# Patient Record
Sex: FEMALE | Race: WHITE | NOT HISPANIC OR LATINO | URBAN - METROPOLITAN AREA
[De-identification: names, ages, dates, MRNs, and addresses within clinical notes are randomized per-mention and may not be internally consistent; named-entity substitution may affect disease eponyms.]

---

## 2017-11-22 NOTE — PATIENT DISCUSSION
The patient feels that the cataract is significantly impacting daily activities and has elected cataract surgery. The risks, benefits, and alternatives to surgery were discussed. The patient elects to proceed with surgery. BASIC GOAL ENRIQUE i only want what my insurance will cover.

## 2017-11-22 NOTE — PATIENT DISCUSSION
The patient feels that the cataract is significantly impacting daily activities and has elected cataract surgery. The risks, benefits, and alternatives to surgery were discussed. The patient elects to proceed with surgery. R/R after first is stable.

## 2017-12-07 NOTE — PATIENT DISCUSSION
The patient was seen post-operatively following surgery. The patient was advised that the eye tolerated the surgery well without complications. I recommended that the patient start the post-operative antibiotic and steroid eye drops. The patient was also advised to contact the office immediately if new symptoms or visual changes are noted. The patient was given additional written postoperative instructions.

## 2018-02-08 NOTE — PATIENT DISCUSSION
Based on today’s exam and diagnostic studies, the determination was made for treatment today. all other ROS negative except as per HPI

## 2018-02-08 NOTE — PROCEDURE NOTE: CLINICAL
PROCEDURE NOTE: Avastin () #1 OD. Anesthesia: Topical/Subconjunctival. Prep: Betadine Drops and Betadine Scrub. Intravitreal injection of Avastin 1.25mg/0.05 ml was given. Injection site: 3-4 mm from the limbus. Patient tolerated procedure well. CF vision checked. There were no complications. Post-op instructions given. Lot #: [email or web address]. Expiration Date: . EXP. Inventory Id: nalini Boss

## 2018-03-13 NOTE — PROCEDURE NOTE: CLINICAL
PROCEDURE NOTE: Avastin () #2 OD. Diagnosis: Neovascular AMD with Active CNV. Anesthesia: Topical/Subconjunctival. Prep: Betadine Drops and Betadine Scrub. Intravitreal injection of Avastin 1.25mg/0.05 ml was given. Injection site: 3-4 mm from the limbus. Patient tolerated procedure well. CF vision checked. There were no complications. Post-op instructions given. Lot #: [email or web address]. Expiration Date: Fri May 04 2018 00:00:00 [facility] Daylight Time). Inventory Id: nalini Gandhi Annemarie

## 2018-07-26 NOTE — PATIENT DISCUSSION
Based on today’s exam and diagnostic studies, the determination was made for treatment today and another injection in one month.

## 2018-07-26 NOTE — PROCEDURE NOTE: CLINICAL
PROCEDURE NOTE: Avastin () #5 OD. Diagnosis: Neovascular AMD with Active CNV. Anesthesia: Topical. Prep: Betadine Drops and Betadine Scrub. Intravitreal injection of Avastin 1.25mg/0.05 ml was given. Injection site: 3-4 mm from the limbus. Patient tolerated procedure well. CF vision checked. There were no complications. Post-op instructions given. Lot #: [id number]. Expiration Date: . EXP. Inventory Id: nalini Du

## 2018-09-27 NOTE — PROCEDURE NOTE: CLINICAL
PROCEDURE NOTE: Avastin () #7 OD. Diagnosis: Neovascular AMD with Active CNV. Anesthesia: Topical/Subconjunctival. Prep: Betadine Drops and Betadine Scrub. Intravitreal injection of Avastin 1.25mg/0.05 ml was given. Injection site: 3-4 mm from the limbus. Patient tolerated procedure well. CF vision checked. There were no complications. Post-op instructions given. Lot #: [email or web address]. Expiration Date: Wed Oct 24 2018 00:00:00 [facility] Daylight Time). Inventory Id: nalini Martínez

## 2018-09-27 NOTE — PATIENT DISCUSSION
Discussed with the patient that the fluid is resolving but there is a tiny amount that needs to be treated today.

## 2019-01-30 ENCOUNTER — IMPORTED ENCOUNTER (OUTPATIENT)
Dept: URBAN - METROPOLITAN AREA CLINIC 31 | Facility: CLINIC | Age: 64
End: 2019-01-30

## 2019-01-30 PROBLEM — H25.813: Noted: 2019-01-30

## 2019-01-30 PROBLEM — H53.022: Noted: 2019-01-30

## 2019-01-30 PROCEDURE — 92014 COMPRE OPH EXAM EST PT 1/>: CPT

## 2019-01-30 NOTE — PATIENT DISCUSSION
1.  Refractive error - update rx and modify OS to reduce aneisometropia2. Combined Types of Cataract OU: Explained how cataracts can effect vision. Recommend clinical observation. The patient was advised to contact us if any change or worsening of vision. 3. Amblyopia: Monitor vision limited in affected eye. 4. Return for an appointment in 1 year for comprehensive exam. with Dr. Irene iM

## 2019-02-07 NOTE — PROCEDURE NOTE: CLINICAL
PROCEDURE NOTE: Avastin () #9 OD. Diagnosis: Neovascular AMD with Active CNV. Anesthesia: Topical/Subconjunctival. Prep: Betadine Drops and Betadine Scrub. Intravitreal injection of Avastin 1.25mg/0.05 ml was given. Injection site: 3-4 mm from the limbus. Patient tolerated procedure well. CF vision checked. There were no complications. Post-op instructions given. Lot #: [id number]. Expiration Date: Sun Mar 18 2018 00:00:00 [address] Daylight Time). Inventory Id: nalini Zimmerman

## 2019-02-07 NOTE — PATIENT DISCUSSION
I have recommended an injection while Mr. Nery Fowler is here today, due to a recurrence of edema seen on exam and OCT. I will see him on a monthly basis for 2 more.

## 2019-06-25 NOTE — PATIENT DISCUSSION
Discussed with patient that there is less fluid present today. I recommended that we do an Avastin injection today followed by 2 more on a monthly basis.

## 2019-06-25 NOTE — PROCEDURE NOTE: CLINICAL
PROCEDURE NOTE: Avastin () #13 OD. Diagnosis: Neovascular AMD with Active CNV. Anesthesia: Topical/Subconjunctival. Prep: Betadine Drops and Betadine Scrub. Intravitreal injection of Avastin 1.25mg/0.05 ml was given. Injection site: 3-4 mm from the limbus. Patient tolerated procedure well. CF vision checked. There were no complications. Post-op instructions given. Lot #: [id number]. Expiration Date: . EXP. Inventory Id: nalini Howell

## 2020-02-06 ENCOUNTER — IMPORTED ENCOUNTER (OUTPATIENT)
Dept: URBAN - METROPOLITAN AREA CLINIC 31 | Facility: CLINIC | Age: 65
End: 2020-02-06

## 2020-02-06 PROBLEM — H04.123: Noted: 2020-02-06

## 2020-02-06 PROBLEM — H25.813: Noted: 2020-02-06

## 2020-02-06 PROBLEM — H53.022: Noted: 2020-02-06

## 2020-02-06 PROCEDURE — 92014 COMPRE OPH EXAM EST PT 1/>: CPT

## 2020-02-06 PROCEDURE — 92015 DETERMINE REFRACTIVE STATE: CPT

## 2020-02-06 NOTE — PATIENT DISCUSSION
1.  Dry Eye OU:  Continue current management with Artificial Tears. 2.  Combined Types of Cataract OU: Explained how cataracts can effect vision. Recommend clinical observation. The patient was advised to contact us if any change or worsening of vision. 3. Amblyopia: Monitor vision limited in affected eye. 4. Refractive error - Glasses change optional. Polycarbonate/Trivex. 5.  Return for an appointment in 1 year for comprehensive exam. with Dr. Pelon Dee.

## 2020-02-06 NOTE — PATIENT DISCUSSION
Return for an appointment in 1 year for comprehensive exam. with Dr. Charlton Sees. standing still standing still. standing still./none

## 2020-02-11 NOTE — PATIENT DISCUSSION
I have recommended that he have an injection of Avastin in the right eye while he is here today. I will see him on a monthly basis.

## 2020-02-11 NOTE — PATIENT DISCUSSION
Discussed with patient that there is less fluid present today. I have recommended that he have an injection of Avastin today followed by 2 more on a monthly basis.

## 2020-05-14 NOTE — PROCEDURE NOTE: CLINICAL
PROCEDURE NOTE: Avastin () #22 OD. Diagnosis: Neovascular AMD with Active CNV. Anesthesia: Topical/Subconjunctival. Prep: Betadine Drops and Betadine Scrub. Intravitreal injection of Avastin 1.25mg/0.05 ml was given. Injection site: 3-4 mm from the limbus. Patient tolerated procedure well. CF vision checked. There were no complications. Post-op instructions given. Lot #: [id number]. Expiration Date: 7/26/2020. Inventory Id: nalini Parikh

## 2020-07-23 NOTE — PATIENT DISCUSSION
No treatment needed today. I will see him in 1 month for re-evaluation with the possibility of an injection.

## 2020-08-20 NOTE — PROCEDURE NOTE: CLINICAL
PROCEDURE NOTE: Avastin () #24 OD. Diagnosis: Neovascular AMD with Active CNV. Anesthesia: Topical/Subconjunctival. Prep: Betadine Drops and Betadine Scrub. Intravitreal injection of Avastin 1.25mg/0.05 ml was given. Injection site: 3-4 mm from the limbus. Patient tolerated procedure well. CF vision checked. There were no complications. Post-op instructions given. Lot #: [id number]. Expiration Date: 10/19/2020. Inventory Id: nalini Fox

## 2020-08-20 NOTE — PATIENT DISCUSSION
Patient understands condition, prognosis and need for follow up care. I will plan another Avastin injection in one month.

## 2020-10-20 NOTE — PATIENT DISCUSSION
Avastin recommended today after discussion of benefits, risks and alternatives. The injection was given and tolerated well by patient. Post-injection instructions were reviewed and understood by the patient. ---

## 2020-11-13 ENCOUNTER — OFFICE VISIT (OUTPATIENT)
Dept: URBAN - METROPOLITAN AREA CLINIC 63 | Facility: CLINIC | Age: 65
End: 2020-11-13

## 2020-12-10 NOTE — PROCEDURE NOTE: CLINICAL
PROCEDURE NOTE: Avastin () #26 OD. Diagnosis: Neovascular AMD with Active CNV. Anesthesia: Topical/Subconjunctival. Prep: Betadine Drops and Betadine Scrub. Intravitreal injection of Avastin 1.25mg/0.05 ml was given. Injection site: 3-4 mm from the limbus. Patient tolerated procedure well. CF vision checked. There were no complications. Post-op instructions given. Lot #: [id number]. Expiration Date: 1/14/2021. Inventory Id: kwame. Brody Cevallos

## 2021-01-07 ENCOUNTER — OFFICE VISIT (OUTPATIENT)
Dept: URBAN - METROPOLITAN AREA CLINIC 63 | Facility: CLINIC | Age: 66
End: 2021-01-07

## 2021-01-22 ENCOUNTER — OFFICE VISIT (OUTPATIENT)
Dept: URBAN - METROPOLITAN AREA CLINIC 63 | Facility: CLINIC | Age: 66
End: 2021-01-22

## 2021-02-08 ENCOUNTER — IMPORTED ENCOUNTER (OUTPATIENT)
Dept: URBAN - METROPOLITAN AREA CLINIC 31 | Facility: CLINIC | Age: 66
End: 2021-02-08

## 2021-02-08 PROBLEM — H53.022: Noted: 2021-02-08

## 2021-02-08 PROBLEM — H25.813: Noted: 2021-02-08

## 2021-02-08 PROBLEM — H04.123: Noted: 2021-02-08

## 2021-02-08 PROCEDURE — 92014 COMPRE OPH EXAM EST PT 1/>: CPT

## 2021-02-08 NOTE — PATIENT DISCUSSION
1.  Dry Eye OU:  Continue current management with Artificial Tears. 2.  Combined Types of Cataract OU: Monitor. 3. Amblyopia: Monitor vision limited in affected eye. 4. Refractive error - Consider glasses change. Polycarbonate/Trivex. 5.  Return for an appointment in 1 year for comprehensive exam. with Dr. Kenny Urbina.

## 2021-02-17 ENCOUNTER — OFFICE VISIT (OUTPATIENT)
Dept: URBAN - METROPOLITAN AREA SURGERY CENTER 4 | Facility: SURGERY CENTER | Age: 66
End: 2021-02-17

## 2021-02-19 LAB — PATHOLOGY (INDENTED REPORT): (no result)

## 2021-03-04 ENCOUNTER — OFFICE VISIT (OUTPATIENT)
Dept: URBAN - METROPOLITAN AREA CLINIC 63 | Facility: CLINIC | Age: 66
End: 2021-03-04

## 2021-04-01 NOTE — PATIENT DISCUSSION
There is no recurrence of intraretinal or subretinal fluid on spectral domain OCT today. Defer injection today.

## 2021-05-13 NOTE — PATIENT DISCUSSION
35 minutes was spent in face to face dialogue with patient. I will plan an Avastin injection today followed by 2 more on a monthly basis.

## 2021-05-13 NOTE — PROCEDURE NOTE: CLINICAL
PROCEDURE NOTE: Avastin () #29 OD. Diagnosis: Neovascular AMD with Active CNV. Anesthesia: Topical/Subconjunctival. Prep: Betadine Drops and Betadine Scrub. Intravitreal injection of Avastin 1.25mg/0.05 ml was given. Injection site: 3-4 mm from the limbus. Patient tolerated procedure well. CF vision checked. There were no complications. Post-op instructions given. Lot #: [id number]. Expiration Date: 6/16/2021. Inventory Id: null. Brody Hand

## 2021-06-17 NOTE — PROCEDURE NOTE: CLINICAL
PROCEDURE NOTE: Avastin () #30 OD. Diagnosis: Neovascular AMD with Active CNV. Anesthesia: Topical/Subconjunctival. Prep: Betadine Drops and Betadine Scrub. Intravitreal injection of Avastin 1.25mg/0.05 ml was given. Injection site: 3-4 mm from the limbus. Patient tolerated procedure well. CF vision checked. There were no complications. Post-op instructions given. Lot #: [id number]. Expiration Date: 5857-15-82F77:00:00. Inventory Id: nalini Ramos

## 2021-07-01 NOTE — PATIENT DISCUSSION
CM spoke to [address] [place] [address]. Per Franklyn Alonso, pt daughter refusing HHC, stating that they would call when they are ready for services.   Ailyn Mallory RN matilde with patient.

## 2021-07-15 NOTE — PROCEDURE NOTE: CLINICAL
PROCEDURE NOTE: Avastin () #31 OD. Diagnosis: Neovascular AMD with Active CNV. Anesthesia: Topical. Prep: Betadine Drops and Betadine Scrub. Intravitreal injection of Avastin 1.25mg/0.05 ml was given. Injection site: 3-4 mm from the limbus. Patient tolerated procedure well. CF vision checked. There were no complications. Post-op instructions given. Lot #: [id number]. Expiration Date: 7331-80-40I27:00:00. Inventory Id: nalini Parikh

## 2021-08-26 NOTE — PATIENT DISCUSSION
35 minutes was spent in face to face dialogue with patient and his wife. I have deferred any treatment today and will see him in 6 weeks for re-evaluation with the possibility of an injection.

## 2021-10-08 NOTE — PROCEDURE NOTE: CLINICAL
PROCEDURE NOTE: Avastin () #32 OD. Diagnosis: Neovascular AMD with Active CNV. Anesthesia: Topical/Subconjunctival. Prep: Betadine Drops and Betadine Scrub. Intravitreal injection of Avastin 1.25mg/0.05 ml was given. Injection site: 3-4 mm from the limbus. Patient tolerated procedure well. CF vision checked. There were no complications. Post-op instructions given. Lot #: [id number]. Expiration Date: 12/18/2021. Inventory Id: nalini Rios

## 2021-10-08 NOTE — PATIENT DISCUSSION
25 minutes spent in face to face dialogue with patient and his wife. I have recommended that he have an injection of Avastin while he is here today. I will see him in 1 month for #33 OD.

## 2021-10-18 ENCOUNTER — IMPORTED ENCOUNTER (OUTPATIENT)
Dept: URBAN - METROPOLITAN AREA CLINIC 31 | Facility: CLINIC | Age: 66
End: 2021-10-18

## 2021-10-18 PROBLEM — H53.022: Noted: 2021-10-18

## 2021-10-18 PROBLEM — H04.123: Noted: 2021-10-18

## 2021-10-18 PROBLEM — H25.813: Noted: 2021-10-18

## 2021-10-18 PROCEDURE — 92015 DETERMINE REFRACTIVE STATE: CPT

## 2021-10-18 PROCEDURE — 92014 COMPRE OPH EXAM EST PT 1/>: CPT

## 2021-10-18 NOTE — PATIENT DISCUSSION
1.  Dry Eye OU:  Continue current management with Artificial Tears. 2.  Combined Types of Cataract OU: Monitor. 3. Amblyopia: Monitor vision limited in affected eye. 4. Refractive error - Glasses change. Polycarbonate/Trivex. 5. Mucous strands OU - Try Alaway or Zaditor QAM to see if helps reduce and stop fishing strands out with fingers. 5.  Return for an appointment in 1 year for comprehensive exam with Dr. Yo Whitley.

## 2021-11-16 NOTE — PROCEDURE NOTE: CLINICAL
PROCEDURE NOTE: Avastin () #33 OD. Diagnosis: Neovascular AMD with Active CNV. Anesthesia: Topical/Subconjunctival. Prep: Betadine Drops and Betadine Scrub. Intravitreal injection of Avastin 1.25mg/0.05 ml was given. Injection site: 3-4 mm from the limbus. Patient tolerated procedure well. CF vision checked. There were no complications. Post-op instructions given. Lot #: [id number]. Expiration Date: [id number]:00:00. Inventory Id: nalini Moncada

## 2021-12-31 NOTE — PATIENT DISCUSSION
Patient understands condition, prognosis and need for follow up care. Over 45 minutes spent in conversation with the patient. I will see him again in 2 months.

## 2022-01-03 ENCOUNTER — IMPORTED ENCOUNTER (OUTPATIENT)
Dept: URBAN - METROPOLITAN AREA CLINIC 31 | Facility: CLINIC | Age: 67
End: 2022-01-03

## 2022-01-03 NOTE — PATIENT DISCUSSION
Pt has adapted well to sunglasses. Has a problem with her clear pair especially when looking to the sides. Have clear pair remade at [facility] to new rx with power reduced OS to decrease aneiso (knows [facility] will not be as clear) and new power OD to reduce cyl. Pt has been more comfortable with OTC 2.50 even though OS is blurrier. Seems to be adapting well to new reading glasses too.

## 2022-02-21 NOTE — PATIENT DISCUSSION
Patient understands condition, prognosis and need for follow up care. Over 45 minutes spent in conversation with the patient. I will plan one more Avastin injection in the right eye in one month.

## 2022-02-21 NOTE — PATIENT DISCUSSION
Mild recurrent edema, Recommended Avastin injection today. The injection was given and tolerated well by patient. Post-injection instructions were reviewed and understood by the patient.

## 2022-02-21 NOTE — PROCEDURE NOTE: CLINICAL
PROCEDURE NOTE: Avastin () #34 OD. Diagnosis: Neovascular AMD with Active CNV. Anesthesia: Topical/Subconjunctival. Prep: Betadine Drops and Betadine Scrub. Betadine prep was performed. Product is within expiration date, and has been verified. An unopened 30 g needle was securely affixed to the 1 cc syringe. Excess drug and air bubbles were carefully expressed such that the plunger aligned with the .05 mL padmini on the syringe. A lid speculum was used. After the Betadine prep, intravitreal injection of Avastin 1.25mg/0.05 ml was given. Injection site: 3-4 mm from the limbus. The needle was withdrawn; retinal perfusion was verified. Lid speculum removed. The eye was irrigated with sterile irrigating solution. The betadine was washed away. Patient tolerated procedure well. Count fingers vision was verified. There were no complications. Patient given office phone number/answering service phone number. Post-injection instructions were reviewed and understood. Symptoms of RD and endophthalmitis following intravitreal injection were discussed. Patient advised to call right away if any loss of vision, new floaters, or eye pain. Post-op instructions given. Patient was given the standard instruction sheet. Appropriate follow-up was arranged. Art Moncada

## 2022-03-21 NOTE — PROCEDURE NOTE: CLINICAL
PROCEDURE NOTE: Avastin () #35 OD. Diagnosis: Neovascular AMD with Active CNV. Anesthesia: Topical/Subconjunctival. Prep: Betadine Drops and Betadine Scrub. Betadine prep was performed. Product is within expiration date, and has been verified. An unopened 30 g needle was securely affixed to the 1 cc syringe. Excess drug and air bubbles were carefully expressed such that the plunger aligned with the .05 mL padmini on the syringe. A lid speculum was used. After the Betadine prep, intravitreal injection of Avastin 1.25mg/0.05 ml was given. Injection site: 3-4 mm from the limbus. The needle was withdrawn; retinal perfusion was verified. Lid speculum removed. The eye was irrigated with sterile irrigating solution. The betadine was washed away. Patient tolerated procedure well. Count fingers vision was verified. There were no complications. Patient given office phone number/answering service phone number. Post-injection instructions were reviewed and understood. Symptoms of RD and endophthalmitis following intravitreal injection were discussed. Patient advised to call right away if any loss of vision, new floaters, or eye pain. Post-op instructions given. Patient was given the standard instruction sheet. Appropriate follow-up was arranged. Vandana Graves

## 2022-03-21 NOTE — PATIENT DISCUSSION
Avastin done today in the right eye without complication.  Mr. Vee Metzger will return in 4-6 weeks for exam and possible AVastin.

## 2022-04-01 ASSESSMENT — VISUAL ACUITY
OS_CC: J1
OD_SC: 20/20
OD_SC: 20/20-2
OS_PH: CC 20/50
OD_SC: 20/30-2
OS_SC: 20/60
OS_CC: J5
OS_CC: 20/400
OS_SC: 20/50-1
OS_PH: CC 20/40 -2
OS_SC: 20/60-1
OD_CC: J1+
OD_SC: 20/30-2
OD_CC: 20/80
OS_SC: 20/60-2
OS_PH: CC 20/30
OD_CC: J1+

## 2022-04-01 ASSESSMENT — TONOMETRY
OD_IOP_MMHG: 14
OS_IOP_MMHG: 16
OD_IOP_MMHG: 14
OS_IOP_MMHG: 15
OD_IOP_MMHG: 14
OS_IOP_MMHG: 16
OD_IOP_MMHG: 14
OS_IOP_MMHG: 14

## 2022-06-09 NOTE — PROCEDURE NOTE: CLINICAL
PROCEDURE NOTE: Avastin () #36 OD. Diagnosis: Neovascular AMD with Active CNV. Anesthesia: Topical/Subconjunctival. Prep: Betadine Drops and Betadine Scrub. Betadine prep was performed. Product is within expiration date, and has been verified. An unopened 30 g needle was securely affixed to the 1 cc syringe. Excess drug and air bubbles were carefully expressed such that the plunger aligned with the .05 mL padmini on the syringe. A lid speculum was used. After the Betadine prep, intravitreal injection of Avastin 1.25mg/0.05 ml was given. Injection site: 3-4 mm from the limbus. The needle was withdrawn; retinal perfusion was verified. Lid speculum removed. The eye was irrigated with sterile irrigating solution. The betadine was washed away. Patient tolerated procedure well. Count fingers vision was verified. There were no complications. Patient given office phone number/answering service phone number. Post-injection instructions were reviewed and understood. Symptoms of RD and endophthalmitis following intravitreal injection were discussed. Patient advised to call right away if any loss of vision, new floaters, or eye pain. Post-op instructions given. Patient was given the standard instruction sheet. Appropriate follow-up was arranged. Miriam Ernandez

## 2022-06-09 NOTE — PATIENT DISCUSSION
Avastin injection x36 RIGHT EYE today. The injection was given and tolerated well by patient. Post-injection instructions were reviewed and understood by the patient.

## 2022-06-09 NOTE — PATIENT DISCUSSION
Spent 35 min with patient.  Discussed findings on exam and OCT of the right eye.  I mentioned there is recurrent srf seen today. I recommend an Avastin injection today and follow up with me in 6 weeks for a poss avastin.  Next visit will be a complete visit.  Dilate both eyes and get oct both eyes.

## 2022-06-16 ENCOUNTER — OFFICE VISIT (OUTPATIENT)
Dept: URBAN - METROPOLITAN AREA CLINIC 63 | Facility: CLINIC | Age: 67
End: 2022-06-16

## 2022-07-09 ENCOUNTER — TELEPHONE ENCOUNTER (OUTPATIENT)
Dept: URBAN - METROPOLITAN AREA CLINIC 121 | Facility: CLINIC | Age: 67
End: 2022-07-09

## 2022-07-09 RX ORDER — BLOOD PRESSURE TEST KIT-LARGE
KIT MISCELLANEOUS
Refills: 0 | OUTPATIENT
Start: 2017-08-17 | End: 2017-10-16

## 2022-07-09 RX ORDER — TAMOXIFEN CITRATE 20 MG/1
TABLET, FILM COATED ORAL ONCE A DAY
Refills: 0 | OUTPATIENT
Start: 2017-08-17 | End: 2017-10-16

## 2022-07-10 ENCOUNTER — TELEPHONE ENCOUNTER (OUTPATIENT)
Dept: URBAN - METROPOLITAN AREA CLINIC 121 | Facility: CLINIC | Age: 67
End: 2022-07-10

## 2022-07-10 RX ORDER — BLOOD PRESSURE TEST KIT-LARGE
KIT MISCELLANEOUS ONCE A DAY
Refills: 0 | Status: ACTIVE | COMMUNITY
Start: 2017-10-16

## 2022-07-10 RX ORDER — ONDANSETRON HYDROCHLORIDE 4 MG/1
TAKE 1 TABLET 1 HOUR BEFORE EACH HALF OF COLON PREP TABLET, FILM COATED ORAL
Refills: 0 | Status: ACTIVE | COMMUNITY
Start: 2022-06-16

## 2022-07-10 RX ORDER — IRBESARTAN 150 MG/1
TABLET ORAL
Refills: 0 | Status: ACTIVE | COMMUNITY
Start: 2021-01-22

## 2022-07-10 RX ORDER — LEVOTHYROXINE SODIUM 50 UG/1
TABLET ORAL
Refills: 0 | Status: ACTIVE | COMMUNITY
Start: 2021-01-13

## 2022-07-10 RX ORDER — TAMOXIFEN CITRATE 20 MG/1
TABLET, FILM COATED ORAL ONCE A DAY
Refills: 0 | Status: ACTIVE | COMMUNITY
Start: 2017-10-16

## 2022-07-10 RX ORDER — OMEPRAZOLE 20 MG/1
CAPSULE, DELAYED RELEASE ORAL
Refills: 0 | Status: ACTIVE | COMMUNITY
Start: 2021-01-20

## 2022-07-20 ENCOUNTER — OFFICE VISIT (OUTPATIENT)
Dept: URBAN - METROPOLITAN AREA SURGERY CENTER 4 | Facility: SURGERY CENTER | Age: 67
End: 2022-07-20

## 2022-08-03 ENCOUNTER — OFFICE VISIT (OUTPATIENT)
Dept: URBAN - METROPOLITAN AREA SURGERY CENTER 4 | Facility: SURGERY CENTER | Age: 67
End: 2022-08-03
Payer: COMMERCIAL

## 2022-08-03 ENCOUNTER — TELEPHONE ENCOUNTER (OUTPATIENT)
Dept: URBAN - METROPOLITAN AREA CLINIC 63 | Facility: CLINIC | Age: 67
End: 2022-08-03

## 2022-08-03 ENCOUNTER — CLAIMS CREATED FROM THE CLAIM WINDOW (OUTPATIENT)
Dept: URBAN - METROPOLITAN AREA CLINIC 4 | Facility: CLINIC | Age: 67
End: 2022-08-03
Payer: COMMERCIAL

## 2022-08-03 DIAGNOSIS — Z86.010 COLON POLYP HISTORY: ICD-10-CM

## 2022-08-03 DIAGNOSIS — D12.3 BENIGN NEOPLASM OF TRANSVERSE COLON: ICD-10-CM

## 2022-08-03 DIAGNOSIS — D12.1 BENIGN NEOPLASM OF APPENDIX: ICD-10-CM

## 2022-08-03 DIAGNOSIS — K63.89 OTHER SPECIFIED DISEASES OF INTESTINE: ICD-10-CM

## 2022-08-03 DIAGNOSIS — K64.0 GRADE I INTERNAL HEMORRHOIDS: ICD-10-CM

## 2022-08-03 PROCEDURE — G8918 PT W/O PREOP ORDER IV AB PRO: HCPCS | Performed by: INTERNAL MEDICINE

## 2022-08-03 PROCEDURE — 45385 COLONOSCOPY W/LESION REMOVAL: CPT | Performed by: INTERNAL MEDICINE

## 2022-08-03 PROCEDURE — G8907 PT DOC NO EVENTS ON DISCHARG: HCPCS | Performed by: INTERNAL MEDICINE

## 2022-08-03 PROCEDURE — 88305 TISSUE EXAM BY PATHOLOGIST: CPT | Performed by: PATHOLOGY

## 2022-08-03 RX ORDER — TAMOXIFEN CITRATE 20 MG/1
TABLET, FILM COATED ORAL ONCE A DAY
Refills: 0 | Status: ACTIVE | COMMUNITY
Start: 2017-10-16

## 2022-08-03 RX ORDER — ONDANSETRON HYDROCHLORIDE 4 MG/1
TAKE 1 TABLET 1 HOUR BEFORE EACH HALF OF COLON PREP TABLET, FILM COATED ORAL
Refills: 0 | Status: ACTIVE | COMMUNITY
Start: 2022-06-16

## 2022-08-03 RX ORDER — IRBESARTAN 150 MG/1
TABLET ORAL
Refills: 0 | Status: ACTIVE | COMMUNITY
Start: 2021-01-22

## 2022-08-03 RX ORDER — BLOOD PRESSURE TEST KIT-LARGE
KIT MISCELLANEOUS ONCE A DAY
Refills: 0 | Status: ACTIVE | COMMUNITY
Start: 2017-10-16

## 2022-08-03 RX ORDER — LEVOTHYROXINE SODIUM 50 UG/1
TABLET ORAL
Refills: 0 | Status: ACTIVE | COMMUNITY
Start: 2021-01-13

## 2022-08-03 RX ORDER — OMEPRAZOLE 20 MG/1
CAPSULE, DELAYED RELEASE ORAL
Refills: 0 | Status: ACTIVE | COMMUNITY
Start: 2021-01-20

## 2022-08-04 ENCOUNTER — OFFICE VISIT (OUTPATIENT)
Dept: URBAN - METROPOLITAN AREA CLINIC 63 | Facility: CLINIC | Age: 67
End: 2022-08-04

## 2022-08-09 PROBLEM — 428283002 HISTORY OF POLYP OF COLON: Status: ACTIVE | Noted: 2022-08-09

## 2022-08-10 ENCOUNTER — TELEPHONE ENCOUNTER (OUTPATIENT)
Dept: URBAN - METROPOLITAN AREA CLINIC 23 | Facility: CLINIC | Age: 67
End: 2022-08-10

## 2022-08-11 ENCOUNTER — TELEPHONE ENCOUNTER (OUTPATIENT)
Dept: URBAN - METROPOLITAN AREA SURGERY CENTER 4 | Facility: SURGERY CENTER | Age: 67
End: 2022-08-11

## 2022-09-01 NOTE — PROCEDURE NOTE: CLINICAL
PROCEDURE NOTE: Avastin () #37 OD. Diagnosis: Neovascular AMD with Active CNV. Anesthesia: Topical/Subconjunctival. Prep: Betadine Drops and Betadine Scrub. Betadine prep was performed. Product is within expiration date, and has been verified. An unopened 30 g needle was securely affixed to the 1 cc syringe. Excess drug and air bubbles were carefully expressed such that the plunger aligned with the .05 mL padmini on the syringe. A lid speculum was used. After the Betadine prep, intravitreal injection of Avastin 1.25mg/0.05 ml was given. Injection site: 3-4 mm from the limbus. The needle was withdrawn; retinal perfusion was verified. Lid speculum removed. The eye was irrigated with sterile irrigating solution. The betadine was washed away. Patient tolerated procedure well. Count fingers vision was verified. There were no complications. Patient given office phone number/answering service phone number. Post-injection instructions were reviewed and understood. Symptoms of RD and endophthalmitis following intravitreal injection were discussed. Patient advised to call right away if any loss of vision, new floaters, or eye pain. Post-op instructions given. Patient was given the standard instruction sheet. Appropriate follow-up was arranged. Dunia Villar

## 2022-09-01 NOTE — PATIENT DISCUSSION
Recommended Avastin injection #37/37 today. The injection was given and tolerated well by patient. Post-injection instructions were reviewed and understood by the patient.

## 2022-11-29 NOTE — PATIENT DISCUSSION
Advised patient to continue annual exams with Dr. Charles Bloom for general eye care with updated refraction.

## 2022-11-29 NOTE — PROCEDURE NOTE: CLINICAL
PROCEDURE NOTE: Avastin () #38 OD. Diagnosis: Neovascular AMD with Active CNV. Anesthesia: Topical/Subconjunctival. Prep: Betadine Drops and Betadine Scrub. Betadine prep was performed. Product is within expiration date, and has been verified. An unopened 30 g needle was securely affixed to the 1 cc syringe. Excess drug and air bubbles were carefully expressed such that the plunger aligned with the .05 mL padmini on the syringe. A lid speculum was used. After the Betadine prep, intravitreal injection of Avastin 1.25mg/0.05 ml was given. Injection site: 3-4 mm from the limbus. The needle was withdrawn; retinal perfusion was verified. Lid speculum removed. The eye was irrigated with sterile irrigating solution. The betadine was washed away. Patient tolerated procedure well. Count fingers vision was verified. There were no complications. Patient given office phone number/answering service phone number. Post-injection instructions were reviewed and understood. Symptoms of RD and endophthalmitis following intravitreal injection were discussed. Patient advised to call right away if any loss of vision, new floaters, or eye pain. Post-op instructions given. Patient was given the standard instruction sheet. Appropriate follow-up was arranged. Saima Guadarrama

## 2022-11-29 NOTE — PATIENT DISCUSSION
Avastin injection x38/39 today. The injection was given and tolerated well by patient. Post-injection instructions were reviewed and understood by the patient.

## 2023-01-05 NOTE — PATIENT DISCUSSION
Avastin injection x39/39 today. The injection was given and tolerated well by patient. Post-injection instructions were reviewed and understood by the patient.

## 2023-01-05 NOTE — PROCEDURE NOTE: CLINICAL
PROCEDURE NOTE: Avastin () #39 OD. Diagnosis: Neovascular AMD with Active CNV. Anesthesia: Topical/Subconjunctival. Prep: Betadine Drops and Betadine Scrub. Betadine prep was performed. Product is within expiration date, and has been verified. An unopened 30 g needle was securely affixed to the 1 cc syringe. Excess drug and air bubbles were carefully expressed such that the plunger aligned with the .05 mL padmini on the syringe. After the Betadine prep, intravitreal injection of Avastin 1.25mg/0.05 ml was given. Injection site: 3-4 mm from the limbus. The needle was withdrawn; retinal perfusion was verified. The eye was irrigated with sterile irrigating solution. The betadine was washed away. Patient tolerated procedure well. There were no complications. Patient given office phone number/answering service phone number. Post-injection instructions were reviewed and understood. Symptoms of RD and endophthalmitis following intravitreal injection were discussed. Patient advised to call right away if any loss of vision, new floaters, or eye pain. Post-op instructions given. Patient was given the standard instruction sheet. Appropriate follow-up was arranged. Lana Du

## 2023-01-05 NOTE — PATIENT DISCUSSION
Advised patient to continue annual exams with Dr. Franky Ramírez for general eye care with updated refraction.

## 2023-01-18 ENCOUNTER — PREPPED CHART (OUTPATIENT)
Dept: URBAN - METROPOLITAN AREA CLINIC 29 | Facility: CLINIC | Age: 68
End: 2023-01-18

## 2023-01-18 NOTE — PATIENT DISCUSSION
Pt has adapted well to sunglasses. Has a problem with her clear pair especially when looking to the sides. Have clear pair remade at [facility] to new rx with power reduced OS to decrease aneiso (knows [facility] will not be as clear) and new power OD to reduce cyl. Pt has been more comfortable with OTC 2.50 even though OS is blurrier. Seems to be adapting well to new reading glasses too.

## 2023-01-19 ENCOUNTER — COMPREHENSIVE EXAM (OUTPATIENT)
Dept: URBAN - METROPOLITAN AREA CLINIC 29 | Facility: CLINIC | Age: 68
End: 2023-01-19

## 2023-01-19 DIAGNOSIS — H25.813: ICD-10-CM

## 2023-01-19 DIAGNOSIS — H52.4: ICD-10-CM

## 2023-01-19 DIAGNOSIS — H16.223: ICD-10-CM

## 2023-01-19 DIAGNOSIS — H53.022: ICD-10-CM

## 2023-01-19 DIAGNOSIS — H52.203: ICD-10-CM

## 2023-01-19 DIAGNOSIS — H52.03: ICD-10-CM

## 2023-01-19 PROCEDURE — 99214 OFFICE O/P EST MOD 30 MIN: CPT

## 2023-01-19 PROCEDURE — 92015 DETERMINE REFRACTIVE STATE: CPT

## 2023-01-19 ASSESSMENT — TONOMETRY
OS_IOP_MMHG: 16
OD_IOP_MMHG: 14

## 2023-01-19 ASSESSMENT — VISUAL ACUITY
OU_CC: J1+
OD_CC: 20/25
OS_CC: 20/25

## 2023-01-19 NOTE — PATIENT DISCUSSION
Pt has adapted well to sunglasses. Has a problem with her clear pair especially when looking to the sides. Have clear pair remade at [place] to new rx with power reduced OS to decrease aneiso (knows [place] will not be as clear) and new power OD to reduce cyl. Pt has been more comfortable with OTC 2.50 even though OS is blurrier. Seems to be adapting well to new reading glasses too.

## 2023-01-19 NOTE — PATIENT DISCUSSION
Glasses Rx given. Unable to adapt to [facility] in reading glasses so keep tate the same in new reading rx.

## 2024-01-22 ENCOUNTER — COMPREHENSIVE EXAM (OUTPATIENT)
Dept: URBAN - METROPOLITAN AREA CLINIC 29 | Facility: CLINIC | Age: 69
End: 2024-01-22

## 2024-01-22 DIAGNOSIS — H25.813: ICD-10-CM

## 2024-01-22 DIAGNOSIS — H52.4: ICD-10-CM

## 2024-01-22 DIAGNOSIS — H52.03: ICD-10-CM

## 2024-01-22 DIAGNOSIS — H52.223: ICD-10-CM

## 2024-01-22 DIAGNOSIS — H40.033: ICD-10-CM

## 2024-01-22 DIAGNOSIS — H16.223: ICD-10-CM

## 2024-01-22 PROCEDURE — 99214 OFFICE O/P EST MOD 30 MIN: CPT

## 2024-01-22 PROCEDURE — 92132 CPTRZD OPH DX IMG ANT SGM: CPT

## 2024-01-22 PROCEDURE — 92015 DETERMINE REFRACTIVE STATE: CPT

## 2024-01-22 ASSESSMENT — VISUAL ACUITY
OD_CC: 20/25
OD_CC: 20/20
OS_PH: 20/40
OS_CC: 20/40
OS_CC: 20/50

## 2024-01-22 ASSESSMENT — TONOMETRY
OD_IOP_MMHG: 12
OS_IOP_MMHG: 16

## 2024-03-01 NOTE — PATIENT DISCUSSION
Per ERP no need for urine sample Pt for discharge   There is  recurrence of intraretinal or subretinal fluid on spectral domain OCT today. Defer injection today.

## 2024-09-16 ENCOUNTER — FOLLOW UP (OUTPATIENT)
Dept: URBAN - METROPOLITAN AREA CLINIC 29 | Facility: CLINIC | Age: 69
End: 2024-09-16

## 2024-09-16 DIAGNOSIS — H25.813: ICD-10-CM

## 2024-09-16 DIAGNOSIS — H21.233: ICD-10-CM

## 2024-09-16 DIAGNOSIS — H16.223: ICD-10-CM

## 2024-09-16 DIAGNOSIS — H40.033: ICD-10-CM

## 2024-09-16 PROCEDURE — 99213 OFFICE O/P EST LOW 20 MIN: CPT

## 2024-09-16 PROCEDURE — 92132 CPTRZD OPH DX IMG ANT SGM: CPT
